# Patient Record
Sex: MALE | Race: WHITE | NOT HISPANIC OR LATINO | Employment: FULL TIME | URBAN - NONMETROPOLITAN AREA
[De-identification: names, ages, dates, MRNs, and addresses within clinical notes are randomized per-mention and may not be internally consistent; named-entity substitution may affect disease eponyms.]

---

## 2022-06-08 ENCOUNTER — HOSPITAL ENCOUNTER (EMERGENCY)
Facility: HOSPITAL | Age: 32
Discharge: HOME | End: 2022-06-08
Attending: EMERGENCY MEDICINE | Admitting: EMERGENCY MEDICINE

## 2022-06-08 ENCOUNTER — APPOINTMENT (OUTPATIENT)
Dept: CT IMAGING | Facility: HOSPITAL | Age: 32
End: 2022-06-08

## 2022-06-08 VITALS
TEMPERATURE: 98.4 F | BODY MASS INDEX: 25.03 KG/M2 | OXYGEN SATURATION: 99 % | HEIGHT: 69 IN | WEIGHT: 169 LBS | RESPIRATION RATE: 20 BRPM | SYSTOLIC BLOOD PRESSURE: 131 MMHG | DIASTOLIC BLOOD PRESSURE: 89 MMHG | HEART RATE: 70 BPM

## 2022-06-08 DIAGNOSIS — M54.42 ACUTE LOW BACK PAIN WITH LEFT-SIDED SCIATICA, UNSPECIFIED BACK PAIN LATERALITY: Primary | ICD-10-CM

## 2022-06-08 PROCEDURE — 72131 CT LUMBAR SPINE W/O DYE: CPT

## 2022-06-08 PROCEDURE — 99283 EMERGENCY DEPT VISIT LOW MDM: CPT

## 2022-06-08 RX ORDER — LIDOCAINE 50 MG/G
1 PATCH TOPICAL EVERY 24 HOURS
Qty: 30 EACH | Refills: 0 | Status: SHIPPED | OUTPATIENT
Start: 2022-06-08

## 2022-06-08 RX ORDER — NAPROXEN 500 MG/1
500 TABLET ORAL ONCE
Status: COMPLETED | OUTPATIENT
Start: 2022-06-08 | End: 2022-06-08

## 2022-06-08 RX ORDER — NAPROXEN 250 MG/1
500 TABLET ORAL 2 TIMES DAILY WITH MEALS
Qty: 30 TABLET | Refills: 0 | Status: SHIPPED | OUTPATIENT
Start: 2022-06-08

## 2022-06-08 RX ORDER — HYDROCODONE BITARTRATE AND ACETAMINOPHEN 5; 325 MG/1; MG/1
1 TABLET ORAL EVERY 8 HOURS PRN
Qty: 12 TABLET | Refills: 0 | Status: CANCELLED | OUTPATIENT
Start: 2022-06-08

## 2022-06-08 RX ORDER — PREDNISONE 20 MG/1
40 TABLET ORAL DAILY
Qty: 10 TABLET | Refills: 0 | Status: SHIPPED | OUTPATIENT
Start: 2022-06-08 | End: 2022-06-13

## 2022-06-08 RX ORDER — LIDOCAINE 50 MG/G
1 PATCH TOPICAL
Status: DISCONTINUED | OUTPATIENT
Start: 2022-06-08 | End: 2022-06-08 | Stop reason: HOSPADM

## 2022-06-08 RX ORDER — HYDROCODONE BITARTRATE AND ACETAMINOPHEN 5; 325 MG/1; MG/1
1 TABLET ORAL ONCE
Status: COMPLETED | OUTPATIENT
Start: 2022-06-08 | End: 2022-06-08

## 2022-06-08 RX ADMIN — HYDROCODONE BITARTRATE AND ACETAMINOPHEN 1 TABLET: 5; 325 TABLET ORAL at 09:30

## 2022-06-08 RX ADMIN — LIDOCAINE 1 PATCH: 50 PATCH CUTANEOUS at 09:30

## 2022-06-08 RX ADMIN — NAPROXEN 500 MG: 500 TABLET ORAL at 09:30

## 2022-06-08 NOTE — DISCHARGE INSTRUCTIONS
Take steroids as prescribed.  Take pain medications as prescribed as needed.  Rest as much as possible.  Alternate ice and heat to lower back.  Perform stretches as instructed on paperwork.  Follow up with your spinal surgeon as soon as possible for further outpatient evaluation of today's complaints.  Return to the ER for new or worsening symptoms.

## 2022-06-08 NOTE — ED PROVIDER NOTES
Subjective   History of Present Illness   Patient is a 31-year-old male with history of back surgery 6 years ago presenting to the ER with complaints of severe lower back pain radiating to his left lower extremity after falling while getting out of the car.  Patient states that he was getting out of his car and his foot slipped and he hit his lower back on the concrete really hard yesterday.  He states that it has been difficult to function since then.  He states that he has not lost control of his bowel or bladders and denies saddle anesthesia but states it is been difficult to use the restroom due to difficulty getting up and down from the toilet.  He states that it hurts with any range of motion and he is concerned for reinjury to his back as he had surgery 6 years ago reportedly to repair a herniated disc.  He denies any medications prior to arrival as he states that he was unsure what we would give him here and did not want to take anything that would interact with anything.    Review of Systems   Musculoskeletal: Positive for back pain.   All other systems reviewed and are negative.      History reviewed. No pertinent past medical history.    No Known Allergies    Past Surgical History:   Procedure Laterality Date   • BACK SURGERY         History reviewed. No pertinent family history.    Social History     Socioeconomic History   • Marital status:    Tobacco Use   • Smoking status: Current Every Day Smoker           Objective   Physical Exam  Vitals and nursing note reviewed.   Constitutional:       General: He is not in acute distress.     Appearance: He is not toxic-appearing.   HENT:      Head: Normocephalic and atraumatic.      Right Ear: External ear normal.      Left Ear: External ear normal.      Nose: Nose normal.   Eyes:      Extraocular Movements: Extraocular movements intact.      Conjunctiva/sclera: Conjunctivae normal.   Cardiovascular:      Rate and Rhythm: Tachycardia present.      Heart  sounds: Normal heart sounds.   Pulmonary:      Effort: Pulmonary effort is normal.      Breath sounds: Normal breath sounds.   Abdominal:      General: There is no distension.      Palpations: Abdomen is soft.      Tenderness: There is no abdominal tenderness.   Musculoskeletal:      Cervical back: Normal range of motion and neck supple.      Comments: Pain with any range of motion of lower back, tenderness to L-spine, patient appears uncomfortable with any movement in bed   Skin:     General: Skin is warm and dry.   Neurological:      General: No focal deficit present.      Mental Status: He is alert and oriented to person, place, and time.   Psychiatric:         Mood and Affect: Mood normal.         Behavior: Behavior normal.         Procedures           ED Course  ED Course as of 06/08/22 1008   Wed Jun 08, 2022   0935 Narrative & Impression  PROCEDURE: CT LUMBAR SPINE WO CONTRAST-     HISTORY:  low back pain after fall while getting out of car, hit back on  concrete, h/o surgery 6 years ago due to herniated disc     TECHNIQUE: Thin section axial CT with sagittal and coronal  reconstructions     FINDINGS: No fracture is present. Alignment is normal.     Spinal canal is smaller than normal on a congenital basis. Soft tissue  density in the left paracentral epidural space is noted exerting mass  effect on the left S1 nerve root. This could represent disc material or  scar tissue. Canal stenosis is noted L3-4, L4-5 and L5-S1 related to  combination of degenerative change and congenital bony spinal canal  narrowing     IMPRESSION:  1. Negative CT evaluation of the lumbar spine for acute bony injury.   2. Canal stenosis lower lumbar spine  3. Soft tissue density abutting left S1 nerve root at the L5-S1 level  could represent disc protrusion or scar tissue. MR with contrast would  better differentiate if there are symptoms of left-sided radiculopathy           This study was performed with techniques to keep radiation  doses as low  as reasonably achievable (ALARA). Individualized dose reduction  techniques using automated exposure control or adjustment of vA and/or  kV according to the patient size were employed.      This report was signed and finalized on 6/8/2022 9:22 AM by Juan Carlos Chavez MD.          Specimen Collected: 06/08/22 09:20         [AP]      ED Course User Index  [AP] Buck Ashley JEFFERSON, PAROMAN                                                 MDM   Patient was evaluated in the ER for low back pain after a fall in which he hit his lower back/buttocks on concrete.  He is mildly tachycardic but otherwise hemodynamically stable, no acute distress, nontoxic-appearing on exam.  Patient does have significant tenderness to his lower back and spine.  No focal neurologic deficits.  No red flag symptoms.  Pain was controlled with Norco, naproxen, and Lidoderm patches.  CT of lumbar spine was performed and reveals no acute bony injury but does show concern for disc protrusion versus scar tissue at L5-S1.  MRI with contrast was recommended if there is concern for radiculopathy. Patient states that pain is improved after medications here.  He is agreeable with plan for discharge.  He was advised that he will need to follow-up with his spinal surgeon who performed his surgery 6 years ago as he will need another MRI and reevaluation.  He is agreeable with this plan.  Prescriptions were sent for prednisone, Lidoderm patches, naproxen, and short course of Norco for pain control.  He was advised to follow-up with his PCP as needed.  Precautions were given for return to the ER for any new or worsening symptoms.    Final diagnoses:   Acute low back pain with left-sided sciatica, unspecified back pain laterality       ED Disposition  ED Disposition     ED Disposition   Discharge    Condition   Stable    Comment   --             Lili Colby MD  [address] [facility] [id number] [phone]    Call   As  needed    Spine Surgeon    Schedule an appointment as soon as possible for a visit   for further outpatent evaluation of today's complaints    [facility] Emergency Department  [address] [address] [phone]  Go to   As needed, If symptoms worsen         Medication List      New Prescriptions    lidocaine 5 %  Commonly known as: LIDODERM  Place 1 patch on the skin as directed by provider Daily. Remove & Discard patch within 12 hours or as directed by MD     naproxen 250 MG tablet  Commonly known as: NAPROSYN  Take 2 tablets by mouth 2 (Two) Times a Day With Meals.     predniSONE 20 MG tablet  Commonly known as: DELTASONE  Take 2 tablets by mouth Daily for 5 days.           Where to Get Your Medications      These medications were sent to [id number] - [place], [place] - [address] - [phone]  - [phone] FX  [address] [place] [address]    Phone: [phone]   · lidocaine 5 %  · naproxen 250 MG tablet  · predniSONE 20 MG tablet          Ashley Jane PA-C  06/08/22 3475

## 2023-02-01 ENCOUNTER — APPOINTMENT (OUTPATIENT)
Dept: CT IMAGING | Facility: HOSPITAL | Age: 33
End: 2023-02-01
Payer: MEDICAID

## 2023-02-01 ENCOUNTER — HOSPITAL ENCOUNTER (EMERGENCY)
Facility: HOSPITAL | Age: 33
Discharge: HOME | End: 2023-02-01
Attending: STUDENT IN AN ORGANIZED HEALTH CARE EDUCATION/TRAINING PROGRAM | Admitting: STUDENT IN AN ORGANIZED HEALTH CARE EDUCATION/TRAINING PROGRAM
Payer: MEDICAID

## 2023-02-01 VITALS
DIASTOLIC BLOOD PRESSURE: 97 MMHG | SYSTOLIC BLOOD PRESSURE: 147 MMHG | OXYGEN SATURATION: 100 % | WEIGHT: 175 LBS | TEMPERATURE: 98.5 F | RESPIRATION RATE: 16 BRPM | HEIGHT: 69 IN | HEART RATE: 71 BPM | BODY MASS INDEX: 25.92 KG/M2

## 2023-02-01 DIAGNOSIS — K04.7 APICAL ABSCESS: Primary | ICD-10-CM

## 2023-02-01 LAB
ALBUMIN SERPL-MCNC: 4.5 G/DL (ref 3.5–5.2)
ALBUMIN/GLOB SERPL: 1.6 G/DL
ALP SERPL-CCNC: 80 U/L (ref 39–117)
ALT SERPL W P-5'-P-CCNC: 41 U/L (ref 1–41)
ANION GAP SERPL CALCULATED.3IONS-SCNC: 11.9 MMOL/L (ref 5–15)
AST SERPL-CCNC: 28 U/L (ref 1–40)
BASOPHILS # BLD AUTO: 0.07 10*3/MM3 (ref 0–0.2)
BASOPHILS NFR BLD AUTO: 0.5 % (ref 0–1.5)
BILIRUB SERPL-MCNC: 0.4 MG/DL (ref 0–1.2)
BUN SERPL-MCNC: 7 MG/DL (ref 6–20)
BUN/CREAT SERPL: 9.7 (ref 7–25)
CALCIUM SPEC-SCNC: 9.1 MG/DL (ref 8.6–10.5)
CHLORIDE SERPL-SCNC: 104 MMOL/L (ref 98–107)
CO2 SERPL-SCNC: 22.1 MMOL/L (ref 22–29)
CREAT SERPL-MCNC: 0.72 MG/DL (ref 0.76–1.27)
CRP SERPL-MCNC: <0.3 MG/DL (ref 0–0.5)
D-LACTATE SERPL-SCNC: 0.7 MMOL/L (ref 0.5–2)
DEPRECATED RDW RBC AUTO: 41.8 FL (ref 37–54)
EGFRCR SERPLBLD CKD-EPI 2021: 122.9 ML/MIN/1.73
EOSINOPHIL # BLD AUTO: 0.07 10*3/MM3 (ref 0–0.4)
EOSINOPHIL NFR BLD AUTO: 0.5 % (ref 0.3–6.2)
ERYTHROCYTE [DISTWIDTH] IN BLOOD BY AUTOMATED COUNT: 12.8 % (ref 12.3–15.4)
FLUAV RNA RESP QL NAA+PROBE: NOT DETECTED
FLUBV RNA RESP QL NAA+PROBE: NOT DETECTED
GLOBULIN UR ELPH-MCNC: 2.8 GM/DL
GLUCOSE SERPL-MCNC: 124 MG/DL (ref 65–99)
HCT VFR BLD AUTO: 46.4 % (ref 37.5–51)
HGB BLD-MCNC: 15.6 G/DL (ref 13–17.7)
IMM GRANULOCYTES # BLD AUTO: 0.06 10*3/MM3 (ref 0–0.05)
IMM GRANULOCYTES NFR BLD AUTO: 0.4 % (ref 0–0.5)
LYMPHOCYTES # BLD AUTO: 2.27 10*3/MM3 (ref 0.7–3.1)
LYMPHOCYTES NFR BLD AUTO: 15.5 % (ref 19.6–45.3)
MCH RBC QN AUTO: 29.8 PG (ref 26.6–33)
MCHC RBC AUTO-ENTMCNC: 33.6 G/DL (ref 31.5–35.7)
MCV RBC AUTO: 88.5 FL (ref 79–97)
MONOCYTES # BLD AUTO: 0.7 10*3/MM3 (ref 0.1–0.9)
MONOCYTES NFR BLD AUTO: 4.8 % (ref 5–12)
NEUTROPHILS NFR BLD AUTO: 11.44 10*3/MM3 (ref 1.7–7)
NEUTROPHILS NFR BLD AUTO: 78.3 % (ref 42.7–76)
NRBC BLD AUTO-RTO: 0 /100 WBC (ref 0–0.2)
PLATELET # BLD AUTO: 303 10*3/MM3 (ref 140–450)
PMV BLD AUTO: 9.1 FL (ref 6–12)
POTASSIUM SERPL-SCNC: 3.2 MMOL/L (ref 3.5–5.2)
PROT SERPL-MCNC: 7.3 G/DL (ref 6–8.5)
RBC # BLD AUTO: 5.24 10*6/MM3 (ref 4.14–5.8)
SARS-COV-2 RNA RESP QL NAA+PROBE: NOT DETECTED
SODIUM SERPL-SCNC: 138 MMOL/L (ref 136–145)
WBC NRBC COR # BLD: 14.61 10*3/MM3 (ref 3.4–10.8)

## 2023-02-01 PROCEDURE — 0 AMPICILLIN-SULBACTAM PER 1.5 G: Performed by: STUDENT IN AN ORGANIZED HEALTH CARE EDUCATION/TRAINING PROGRAM

## 2023-02-01 PROCEDURE — 25010000002 IOPAMIDOL 61 % SOLUTION: Performed by: STUDENT IN AN ORGANIZED HEALTH CARE EDUCATION/TRAINING PROGRAM

## 2023-02-01 PROCEDURE — 86140 C-REACTIVE PROTEIN: CPT | Performed by: STUDENT IN AN ORGANIZED HEALTH CARE EDUCATION/TRAINING PROGRAM

## 2023-02-01 PROCEDURE — 70487 CT MAXILLOFACIAL W/DYE: CPT

## 2023-02-01 PROCEDURE — 99284 EMERGENCY DEPT VISIT MOD MDM: CPT

## 2023-02-01 PROCEDURE — 80053 COMPREHEN METABOLIC PANEL: CPT | Performed by: STUDENT IN AN ORGANIZED HEALTH CARE EDUCATION/TRAINING PROGRAM

## 2023-02-01 PROCEDURE — 85025 COMPLETE CBC W/AUTO DIFF WBC: CPT | Performed by: STUDENT IN AN ORGANIZED HEALTH CARE EDUCATION/TRAINING PROGRAM

## 2023-02-01 PROCEDURE — 83605 ASSAY OF LACTIC ACID: CPT | Performed by: STUDENT IN AN ORGANIZED HEALTH CARE EDUCATION/TRAINING PROGRAM

## 2023-02-01 PROCEDURE — 96365 THER/PROPH/DIAG IV INF INIT: CPT

## 2023-02-01 PROCEDURE — 87636 SARSCOV2 & INF A&B AMP PRB: CPT | Performed by: STUDENT IN AN ORGANIZED HEALTH CARE EDUCATION/TRAINING PROGRAM

## 2023-02-01 PROCEDURE — 93005 ELECTROCARDIOGRAM TRACING: CPT | Performed by: STUDENT IN AN ORGANIZED HEALTH CARE EDUCATION/TRAINING PROGRAM

## 2023-02-01 RX ORDER — OXYCODONE HYDROCHLORIDE AND ACETAMINOPHEN 5; 325 MG/1; MG/1
1 TABLET ORAL EVERY 6 HOURS PRN
Qty: 12 TABLET | Refills: 0 | Status: SHIPPED | OUTPATIENT
Start: 2023-02-01

## 2023-02-01 RX ORDER — AMOXICILLIN AND CLAVULANATE POTASSIUM 875; 125 MG/1; MG/1
1 TABLET, FILM COATED ORAL 2 TIMES DAILY
Qty: 28 TABLET | Refills: 0 | Status: SHIPPED | OUTPATIENT
Start: 2023-02-01 | End: 2023-02-15

## 2023-02-01 RX ORDER — OXYCODONE HYDROCHLORIDE AND ACETAMINOPHEN 5; 325 MG/1; MG/1
1 TABLET ORAL ONCE
Status: COMPLETED | OUTPATIENT
Start: 2023-02-01 | End: 2023-02-01

## 2023-02-01 RX ADMIN — OXYCODONE HYDROCHLORIDE AND ACETAMINOPHEN 1 TABLET: 5; 325 TABLET ORAL at 01:33

## 2023-02-01 RX ADMIN — IOPAMIDOL 100 ML: 612 INJECTION, SOLUTION INTRAVENOUS at 01:44

## 2023-02-01 RX ADMIN — SODIUM CHLORIDE 3 G: 900 INJECTION, SOLUTION INTRAVENOUS at 02:41

## 2023-02-01 NOTE — DISCHARGE INSTRUCTIONS
You were evaluated for facial swelling.  We got labs and a CT scan.  This showed that you had an apical abscess but did not have extension into your jaw or throat.  We gave you IV antibiotics in the emergency department and given you a prescription for oral antibiotics to go home with.  We have also given you a short course of pain medicine as needed.  You are now stable for discharge.  Would recommend calling on discharge to your dentist to schedule an appointment urgently for drainage of your abscess.

## 2023-02-01 NOTE — ED PROVIDER NOTES
Subjective  History of Present Illness:    Patient is a 32-year-old male with no significant medical history who presents with facial swelling.  Patient has a fractured tooth at tooth 21 that is been present for over a year.  Patient is never followed up with a dentist for this.  Stated that today he had mild pain to the tooth which was abnormal.  States that throughout the day has had increasing swelling to the left mandible.  Became significant and wife encourage patient to follow-up in our emergency department today.  He denies any fever or chills preceding.  No chest pain or shortness of breath.  Denies any abdominal pain, nausea, vomiting.  Denies any swelling to the bottom of his mouth or in his throat.  Denies any shortness of breath.      Nurses Notes reviewed and agree, including vitals, allergies, social history and prior medical history.     REVIEW OF SYSTEMS: All systems reviewed and not pertinent unless noted.  Review of Systems   Constitutional: Negative for activity change, appetite change, chills, fatigue and fever.   HENT: Positive for dental problem and facial swelling. Negative for congestion, sinus pressure, sneezing and trouble swallowing.    Eyes: Negative for discharge and itching.   Respiratory: Negative for cough and shortness of breath.    Cardiovascular: Negative for chest pain and palpitations.   Gastrointestinal: Negative for abdominal distention and abdominal pain.   Endocrine: Negative for cold intolerance and heat intolerance.   Genitourinary: Negative for decreased urine volume, dysuria and urgency.   Musculoskeletal: Negative for gait problem, neck pain and neck stiffness.   Skin: Negative for color change and rash.   Allergic/Immunologic: Negative for immunocompromised state.   Neurological: Negative for facial asymmetry and headaches.   Hematological: Negative for adenopathy.   Psychiatric/Behavioral: Negative for self-injury and suicidal ideas.       Past Medical History:  "  Diagnosis Date   • Tooth decay 2022       Allergies:    Patient has no known allergies.      Past Surgical History:   Procedure Laterality Date   • BACK SURGERY     • CHOLECYSTECTOMY           Social History     Socioeconomic History   • Marital status:    Tobacco Use   • Smoking status: Every Day     Passive exposure: Never   • Smokeless tobacco: Never   Vaping Use   • Vaping Use: Every day   • Substances: Nicotine, Flavoring   • Devices: Disposable, Pre-filled or refillable cartridge, Refillable tank   Substance and Sexual Activity   • Drug use: Not Currently   • Sexual activity: Yes         Family History   Family history unknown: Yes       Objective  Physical Exam:  /97   Pulse 71   Temp 98.5 °F (36.9 °C) (Oral)   Resp 16   Ht 175.3 cm (69\")   Wt 79.4 kg (175 lb)   SpO2 100%   BMI 25.84 kg/m²      Physical Exam  Constitutional:       General: He is not in acute distress.     Appearance: Normal appearance. He is normal weight. He is not ill-appearing.   HENT:      Head: Normocephalic and atraumatic.      Nose: Nose normal. No congestion or rhinorrhea.      Mouth/Throat:      Mouth: Mucous membranes are moist.      Pharynx: Oropharynx is clear.      Comments: Fracture to tooth 21, surrounding erythema, swelling about the mandible  Eyes:      Extraocular Movements: Extraocular movements intact.      Conjunctiva/sclera: Conjunctivae normal.      Pupils: Pupils are equal, round, and reactive to light.   Cardiovascular:      Rate and Rhythm: Normal rate and regular rhythm.      Pulses: Normal pulses.   Pulmonary:      Effort: Pulmonary effort is normal. No respiratory distress.      Breath sounds: Normal breath sounds.   Abdominal:      General: Abdomen is flat. Bowel sounds are normal. There is no distension.      Palpations: Abdomen is soft.      Tenderness: There is no abdominal tenderness.   Musculoskeletal:         General: No swelling or tenderness. Normal range of motion.      Cervical " back: Normal range of motion and neck supple. No rigidity or tenderness.   Skin:     General: Skin is warm and dry.      Capillary Refill: Capillary refill takes less than 2 seconds.   Neurological:      General: No focal deficit present.      Mental Status: He is alert and oriented to person, place, and time. Mental status is at baseline.      Cranial Nerves: No cranial nerve deficit.      Sensory: No sensory deficit.      Motor: No weakness.      Coordination: Coordination normal.   Psychiatric:         Mood and Affect: Mood normal.         Behavior: Behavior normal.         Thought Content: Thought content normal.         Judgment: Judgment normal.         Procedures    ED Course:         Lab Results (last 24 hours)     Procedure Component Value Units Date/Time    COVID-19 and FLU A/B PCR - Swab, Nasopharynx [[id number]]  (Normal) Collected: 02/01/23 0130    Specimen: Swab from Nasopharynx Updated: 02/01/23 0216     COVID19 Not Detected     Influenza A PCR Not Detected     Influenza B PCR Not Detected    Narrative:      Fact sheet for providers: https://www.fda.gov/media/484830/download    Fact sheet for patients: https://www.fda.gov/media/820908/download    Test performed by PCR.    CBC & Differential [[id number]]  (Abnormal) Collected: 02/01/23 0131    Specimen: Blood Updated: 02/01/23 0138    Narrative:      The following orders were created for panel order CBC & Differential.  Procedure                               Abnormality         Status                     ---------                               -----------         ------                     CBC Auto Differential[[id number]]        Abnormal            Final result                 Please view results for these tests on the individual orders.    Comprehensive Metabolic Panel [[id number]]  (Abnormal) Collected: 02/01/23 0131    Specimen: Blood Updated: 02/01/23 0153     Glucose 124 mg/dL      BUN 7 mg/dL      Creatinine 0.72 mg/dL      Sodium 138 mmol/L       Potassium 3.2 mmol/L      Chloride 104 mmol/L      CO2 22.1 mmol/L      Calcium 9.1 mg/dL      Total Protein 7.3 g/dL      Albumin 4.5 g/dL      ALT (SGPT) 41 U/L      AST (SGOT) 28 U/L      Alkaline Phosphatase 80 U/L      Total Bilirubin 0.4 mg/dL      Globulin 2.8 gm/dL      A/G Ratio 1.6 g/dL      BUN/Creatinine Ratio 9.7     Anion Gap 11.9 mmol/L      eGFR 122.9 mL/min/1.73     Narrative:      GFR Normal >60  Chronic Kidney Disease <60  Kidney Failure <15      C-reactive Protein [[id number]]  (Normal) Collected: 02/01/23 0131    Specimen: Blood Updated: 02/01/23 0155     C-Reactive Protein <0.30 mg/dL     Lactic Acid, Plasma [[id number]]  (Normal) Collected: 02/01/23 0131    Specimen: Blood Updated: 02/01/23 0151     Lactate 0.7 mmol/L     CBC Auto Differential [[id number]]  (Abnormal) Collected: 02/01/23 0131    Specimen: Blood Updated: 02/01/23 0138     WBC 14.61 10*3/mm3      RBC 5.24 10*6/mm3      Hemoglobin 15.6 g/dL      Hematocrit 46.4 %      MCV 88.5 fL      MCH 29.8 pg      MCHC 33.6 g/dL      RDW 12.8 %      RDW-SD 41.8 fl      MPV 9.1 fL      Platelets 303 10*3/mm3      Neutrophil % 78.3 %      Lymphocyte % 15.5 %      Monocyte % 4.8 %      Eosinophil % 0.5 %      Basophil % 0.5 %      Immature Grans % 0.4 %      Neutrophils, Absolute 11.44 10*3/mm3      Lymphocytes, Absolute 2.27 10*3/mm3      Monocytes, Absolute 0.70 10*3/mm3      Eosinophils, Absolute 0.07 10*3/mm3      Basophils, Absolute 0.07 10*3/mm3      Immature Grans, Absolute 0.06 10*3/mm3      nRBC 0.0 /100 WBC            CT Maxillofacial With Contrast    Result Date: 2/1/2023  FINAL REPORT TECHNIQUE: null CLINICAL HISTORY: Sublingual/submandibular abscess COMPARISON: null FINDINGS: Exam: CT face with contrast Comparison: None Clinical history: Sublingual/submandibular abscess Findings: Dental apical abscess seen at the lower left tooth #19. Left perimandibular facial edema reflective of a cellulitis. No soft tissue abscess identified. No  additional dental abscesses. Normal symmetric bilateral submandibular glands. Single enlarged left submandibular lymph node. No submental adenopathy.     Impression: Impression: 1. Dental apical abscess seen at the lower left tooth #19. Left perimandibular facial edema reflective of a cellulitis. No soft tissue abscess identified. Authenticated and Electronically Signed by Layne Durham MD on 02/01/2023 02:12:26 AM     EKG interpreted by me, normal sinus rhythm with no concerning ST changes noted, rate of 83    MDM    Initial impression of presenting illness: Facial swelling    DDX: includes but is not limited to: Dog genic abscess, Angela's angina, sepsis, dental carry    Patient arrives stable with vitals interpreted by myself.     Pertinent features from physical exam: Tachycardic, tooth as mentioned, left facial swelling with intact airway.    Initial diagnostic plan: CBC, CMP, CRP, lactic acid, CT max face    Results from initial plan were reviewed and interpreted by me revealing periapical abscess with no extension to the neck    Diagnostic information from other sources: Discussed with father bedside    Interventions / Re-evaluation: Patient with improved pain with analgesic therapy in the emergency department.  Patient with no airway concerns on reassessment.  Will treat with IV antibiotics in the emergency department, will prescribe course of Augmentin as an outpatient.    Results/clinical rationale were discussed with patient father at bedside    Consultations/Discussion of results with other physicians: Discussed the importance of close dentistry follow-up within the next 2 days.  Patient voiced understanding.  Strict turn precaution for any airway involvement, significant swelling.  Patient voiced understanding and agreed with plan    Disposition plan: Discharge  -----    Final diagnoses:   Apical abscess        Dontrell Werner MD  02/01/23 0329       Dontrell Werner MD  02/01/23 3528

## 2023-03-05 ENCOUNTER — APPOINTMENT (OUTPATIENT)
Dept: GENERAL RADIOLOGY | Facility: HOSPITAL | Age: 33
End: 2023-03-05
Payer: MEDICAID

## 2023-03-05 ENCOUNTER — HOSPITAL ENCOUNTER (EMERGENCY)
Facility: HOSPITAL | Age: 33
Discharge: HOME | End: 2023-03-06
Attending: STUDENT IN AN ORGANIZED HEALTH CARE EDUCATION/TRAINING PROGRAM | Admitting: STUDENT IN AN ORGANIZED HEALTH CARE EDUCATION/TRAINING PROGRAM
Payer: MEDICAID

## 2023-03-05 VITALS
BODY MASS INDEX: 26.66 KG/M2 | HEIGHT: 69 IN | WEIGHT: 180 LBS | DIASTOLIC BLOOD PRESSURE: 85 MMHG | HEART RATE: 98 BPM | OXYGEN SATURATION: 97 % | RESPIRATION RATE: 20 BRPM | TEMPERATURE: 98.3 F | SYSTOLIC BLOOD PRESSURE: 132 MMHG

## 2023-03-05 DIAGNOSIS — S92.531A DISPLACED FRACTURE OF DISTAL PHALANX OF RIGHT LESSER TOE(S), INITIAL ENCOUNTER FOR CLOSED FRACTURE: Primary | ICD-10-CM

## 2023-03-05 PROCEDURE — 73630 X-RAY EXAM OF FOOT: CPT

## 2023-03-05 PROCEDURE — 99283 EMERGENCY DEPT VISIT LOW MDM: CPT

## 2023-03-06 NOTE — ED PROVIDER NOTES
Subjective  History of Present Illness:    Patient is a 32-year-old male with no significant medical history who presents after blunt trauma to the third metatarsal patient states that he was cooking when his cutting board fell onto his third metatarsal.  States that he has had persistent pain with ambulation and discoloration.  Concern for fracture and presents our emergency department for evaluation.  Denies trauma to any other area.  No preceding medical complaints.  Ambulatory into the ER      Nurses Notes reviewed and agree, including vitals, allergies, social history and prior medical history.     REVIEW OF SYSTEMS: All systems reviewed and not pertinent unless noted.  Review of Systems   Constitutional: Negative for activity change, appetite change, chills, fatigue and fever.   HENT: Negative for congestion, sinus pressure, sneezing and trouble swallowing.    Eyes: Negative for discharge and itching.   Respiratory: Negative for cough and shortness of breath.    Cardiovascular: Negative for chest pain and palpitations.   Gastrointestinal: Negative for abdominal distention and abdominal pain.   Endocrine: Negative for cold intolerance and heat intolerance.   Genitourinary: Negative for decreased urine volume, dysuria and urgency.   Musculoskeletal: Positive for arthralgias, gait problem and joint swelling. Negative for neck pain and neck stiffness.   Skin: Negative for color change and rash.   Allergic/Immunologic: Negative for immunocompromised state.   Neurological: Negative for facial asymmetry and headaches.   Hematological: Negative for adenopathy.   Psychiatric/Behavioral: Negative for self-injury and suicidal ideas.       Past Medical History:   Diagnosis Date   • Tooth decay 2022       Allergies:    Patient has no known allergies.      Past Surgical History:   Procedure Laterality Date   • BACK SURGERY     • CHOLECYSTECTOMY           Social History     Socioeconomic History   • Marital status:   "  Tobacco Use   • Smoking status: Every Day     Passive exposure: Never   • Smokeless tobacco: Never   Vaping Use   • Vaping Use: Every day   • Substances: Nicotine, Flavoring   • Devices: Disposable, Pre-filled or refillable cartridge, Refillable tank   Substance and Sexual Activity   • Drug use: Not Currently   • Sexual activity: Yes         Family History   Family history unknown: Yes       Objective  Physical Exam:  /85 (BP Location: Left arm, Patient Position: Sitting)   Pulse 98   Temp 98.3 °F (36.8 °C) (Oral)   Resp 20   Ht 175.3 cm (69\")   Wt 81.6 kg (180 lb)   SpO2 97%   BMI 26.58 kg/m²      Physical Exam  Constitutional:       General: He is not in acute distress.     Appearance: Normal appearance. He is normal weight. He is not ill-appearing.   HENT:      Head: Normocephalic and atraumatic.      Nose: Nose normal. No congestion or rhinorrhea.      Mouth/Throat:      Mouth: Mucous membranes are moist.      Pharynx: Oropharynx is clear.   Eyes:      Extraocular Movements: Extraocular movements intact.      Conjunctiva/sclera: Conjunctivae normal.      Pupils: Pupils are equal, round, and reactive to light.   Cardiovascular:      Rate and Rhythm: Normal rate and regular rhythm.      Pulses: Normal pulses.   Pulmonary:      Effort: Pulmonary effort is normal. No respiratory distress.      Breath sounds: Normal breath sounds.   Abdominal:      General: Abdomen is flat. Bowel sounds are normal. There is no distension.      Palpations: Abdomen is soft.      Tenderness: There is no abdominal tenderness.   Musculoskeletal:         General: Swelling, tenderness and signs of injury present. Normal range of motion.      Cervical back: Normal range of motion and neck supple. No rigidity or tenderness.      Comments: Swelling tenderness and ecchymosis to the third metatarsal   Skin:     General: Skin is warm and dry.      Capillary Refill: Capillary refill takes less than 2 seconds.   Neurological:      " General: No focal deficit present.      Mental Status: He is alert and oriented to person, place, and time. Mental status is at baseline.      Cranial Nerves: No cranial nerve deficit.      Sensory: No sensory deficit.      Motor: No weakness.   Psychiatric:         Mood and Affect: Mood normal.         Behavior: Behavior normal.         Thought Content: Thought content normal.         Judgment: Judgment normal.         Procedures    ED Course:    ED Course as of 03/06/23 0632   Mon Mar 06, 2023   0038 KG interpreted by me, normal sinus rhythm no concerning ST changes noted, rate of 77 [JE]      ED Course User Index  [JE] Dontrell Werner MD       Lab Results (last 24 hours)     ** No results found for the last 24 hours. **           No radiology results from the last 24 hrs       MDM    Initial impression of presenting illness: Third toe pain    DDX: includes but is not limited to: Metacarpal fracture, Bearden fracture, ankle fracture    Patient arrives stable with vitals interpreted by myself.     Pertinent features from physical exam: Nontender to palpation to the malleoli, first or fifth metatarsal, neurovascular intact to the foot.    Initial diagnostic plan: Plain film of the foot, does not need any further imaging as he does not meet criteria for Wel[place] ankle rules for any further imaging    Results from initial plan were reviewed and interpreted by me revealing plain film concerning for fracture of the distal third phalanx    Diagnostic information from other sources: Discussed with wife at bedside    Interventions / Re-evaluation: Given cast shoe    Results/clinical rationale were discussed with patient at bedside    Consultations/Discussion of results with other physicians: Discussed diagnosis of distal phalanx fracture and given Errol.  Encourage patient to follow with orthopedic surgery to ensure adequate healing.  Encourage cast shoe use and RICE therapy at home.  Tylenol and ibuprofen for pain  control.    Disposition plan: Discharge  -----    Final diagnoses:   Displaced fracture of distal phalanx of right lesser toe(s), initial encounter for closed fracture        Edge, Dontrell Ford MD  03/06/23 8440

## 2023-03-06 NOTE — ED TRIAGE NOTES
Pt presents to er with family from home c/o toe pain on 3rd digit of RLE. Pt reports being in the kitchen and a cutting board fell and hit his toe. Toe visibly injured, pt reports 7/10 pain, unable to bear weight, unable to bend. Pt fully a/o, denies any other s/s

## 2023-03-06 NOTE — DISCHARGE INSTRUCTIONS
You were evaluated for foot pain.  We got an x-ray that showed that you had a foot fracture.  We have given you a cast shoe to wear to provide support.  You are now stable for discharge.  Would recommend following up with your primary care doctor and orthopedic surgery to ensure that this injury heals appropriately.  You are now stable for discharge.